# Patient Record
Sex: MALE | Race: WHITE | NOT HISPANIC OR LATINO | Employment: OTHER | URBAN - METROPOLITAN AREA
[De-identification: names, ages, dates, MRNs, and addresses within clinical notes are randomized per-mention and may not be internally consistent; named-entity substitution may affect disease eponyms.]

---

## 2017-01-10 DIAGNOSIS — K51.918 ULCERATIVE COLITIS, CHRONIC, OTHER COMPLICATION: ICD-10-CM

## 2017-01-10 RX ORDER — MORPHINE SULFATE 20 MG/ML
SOLUTION ORAL
Qty: 240 ML | Refills: 0 | Status: SHIPPED | OUTPATIENT
Start: 2017-01-10 | End: 2017-01-25 | Stop reason: SDUPTHER

## 2017-01-25 ENCOUNTER — OFFICE VISIT (OUTPATIENT)
Dept: SURGERY | Facility: CLINIC | Age: 72
End: 2017-01-25
Payer: MEDICARE

## 2017-01-25 VITALS
HEART RATE: 89 BPM | DIASTOLIC BLOOD PRESSURE: 70 MMHG | WEIGHT: 139.56 LBS | HEIGHT: 70 IN | BODY MASS INDEX: 19.98 KG/M2 | SYSTOLIC BLOOD PRESSURE: 131 MMHG

## 2017-01-25 DIAGNOSIS — Z43.4 ATTENTION TO ARTIFICIAL OPENING OF DIGESTIVE TRACT: Primary | ICD-10-CM

## 2017-01-25 DIAGNOSIS — K51.918 ULCERATIVE COLITIS, CHRONIC, OTHER COMPLICATION: ICD-10-CM

## 2017-01-25 PROCEDURE — 99213 OFFICE O/P EST LOW 20 MIN: CPT | Mod: PBBFAC,25 | Performed by: COLON & RECTAL SURGERY

## 2017-01-25 PROCEDURE — 99999 PR PBB SHADOW E&M-EST. PATIENT-LVL III: CPT | Mod: PBBFAC,,, | Performed by: COLON & RECTAL SURGERY

## 2017-01-25 PROCEDURE — 44385 ENDOSCOPY OF BOWEL POUCH: CPT | Mod: PBBFAC | Performed by: COLON & RECTAL SURGERY

## 2017-01-25 PROCEDURE — 99213 OFFICE O/P EST LOW 20 MIN: CPT | Mod: S$PBB,,, | Performed by: COLON & RECTAL SURGERY

## 2017-01-25 PROCEDURE — 44385 ENDOSCOPY OF BOWEL POUCH: CPT | Mod: S$PBB,,, | Performed by: COLON & RECTAL SURGERY

## 2017-01-25 RX ORDER — MORPHINE SULFATE 20 MG/ML
SOLUTION ORAL
Qty: 240 ML | Refills: 0 | Status: SHIPPED | OUTPATIENT
Start: 2017-02-06 | End: 2017-06-01 | Stop reason: SDUPTHER

## 2017-01-25 NOTE — PROGRESS NOTES
.   71 year old male patient presents today for a follow up      Has been breaking teeth. Bowels stable.  s/p Restorative Proctocolectomy with J-pouch ileoanal anastomosis for Ulcerative colitis December 1998. Has been followed for multiple BM. Normal FFS previous visits.    The patient has improved bowel function some days has normal BM. Taking Roxinal 10-15 drips Tid and 25 HS    Review of Systems      Constitutional: No fever, chills, activity or appetite change. 10 lb wt loss is undergoing work up     HENT: No hearing loss, facial swelling, neck pain or stiffness.       Eyes: No discharge, itching and visual disturbance.      Respiratory: No apnea, cough, choking or shortness of breath.       Cardiovascular: No leg swelling or chest pain      Gastrointestinal: No abdominal distention as above   Genitourinary: No dysuria, frequency or flank pain.      Musculoskeletal: stable gait difficulties     Neurological: No dizziness, seizures or weakness.      Hematological: No adenopathy.      Psychiatric/Behavioral: No hallucinations or behavioral problems.       MEDICAL HISTORY:  PAST MEDICAL HISTORY: Reviewed.    MEDICATIONS: SEE MEDCARD    ALLERGIES: See ALLERGY CARD    PE:   APPEARANCE: Well nourished, well developed, in no acute distress.   CHEST: Lungs clear. Normal respiratory effort.  CARDIOVASCULAR: Normal S1, S2. No rubs, murmurs or gallops. No edema.  ABDOMEN: Soft. No tenderness or masses. No hepatosplenomegaly.Healed incisions.  RECTAL: Increased sphincter tone. no masses.    PROCEDURE: Flexible Pouchoscopy   Scope # Olympus 940    With informed consent the flexible sigmoidoscope was passed 15 cm under direcet vision. Prep quality: fair    Findings: normal pouch mucosa    EBL: None    The procedure was tolerated well.        IMP: Restorative Proctocolectomy with J-pouch ileoanal anastomosis  PLAN:  Refil meds  RTC 6 months

## 2017-06-01 DIAGNOSIS — K51.918 ULCERATIVE COLITIS, CHRONIC, OTHER COMPLICATION: ICD-10-CM

## 2017-06-01 NOTE — TELEPHONE ENCOUNTER
----- Message from Millicent Ivan sent at 5/30/2017  2:31 PM CDT -----  Contact: pt [phone]  Prescription Refill Request  Name of medication and dose   morphine 100 mg/5 mL (20 mg/mL) concentrated solution    Pharmacy   [id number] [place] [facility]   [address] [place] [address]   [phone] (Phone)  [phone] (Fax)      Are you completely out of your medication Yes  Additional Information:

## 2017-06-02 RX ORDER — MORPHINE SULFATE 20 MG/ML
SOLUTION ORAL
Qty: 240 ML | Refills: 0 | Status: SHIPPED | OUTPATIENT
Start: 2017-06-02 | End: 2017-07-05 | Stop reason: SDUPTHER

## 2017-07-05 ENCOUNTER — OFFICE VISIT (OUTPATIENT)
Dept: SURGERY | Facility: CLINIC | Age: 72
End: 2017-07-05
Payer: MEDICARE

## 2017-07-05 VITALS
WEIGHT: 138.88 LBS | SYSTOLIC BLOOD PRESSURE: 124 MMHG | HEART RATE: 89 BPM | BODY MASS INDEX: 19.88 KG/M2 | HEIGHT: 70 IN | DIASTOLIC BLOOD PRESSURE: 79 MMHG

## 2017-07-05 DIAGNOSIS — Z43.4 ATTENTION TO ARTIFICIAL OPENING OF DIGESTIVE TRACT: Primary | ICD-10-CM

## 2017-07-05 DIAGNOSIS — K51.918 ULCERATIVE COLITIS, CHRONIC, OTHER COMPLICATION: ICD-10-CM

## 2017-07-05 PROCEDURE — 1126F AMNT PAIN NOTED NONE PRSNT: CPT | Mod: ,,, | Performed by: COLON & RECTAL SURGERY

## 2017-07-05 PROCEDURE — 99999 PR PBB SHADOW E&M-EST. PATIENT-LVL III: CPT | Mod: PBBFAC,,, | Performed by: COLON & RECTAL SURGERY

## 2017-07-05 PROCEDURE — 1159F MED LIST DOCD IN RCRD: CPT | Mod: ,,, | Performed by: COLON & RECTAL SURGERY

## 2017-07-05 PROCEDURE — 99214 OFFICE O/P EST MOD 30 MIN: CPT | Mod: S$PBB,,, | Performed by: COLON & RECTAL SURGERY

## 2017-07-05 PROCEDURE — 99213 OFFICE O/P EST LOW 20 MIN: CPT | Mod: PBBFAC | Performed by: COLON & RECTAL SURGERY

## 2017-07-05 RX ORDER — MORPHINE SULFATE 20 MG/ML
SOLUTION ORAL
Qty: 240 ML | Refills: 0 | Status: SHIPPED | OUTPATIENT
Start: 2017-07-17 | End: 2017-10-16 | Stop reason: SDUPTHER

## 2017-07-05 NOTE — PROGRESS NOTES
.   71 year old male patient presents today for a follow up        Bowels stable on MS Elixir. Pouchoscopy last visit was normal  s/p Restorative Proctocolectomy with J-pouch ileoanal anastomosis for Ulcerative colitis December 1998. Has been followed for multiple BM. Normal FFS previous visits.    The patient has improved bowel function some days has normal BM. Taking Roxinal 10-15 drips Tid and 25 HS    Review of Systems      Constitutional: No fever, chills, activity or appetite change. 10 lb wt loss is undergoing work up     HENT: No hearing loss, facial swelling, neck pain or stiffness.       Eyes: No discharge, itching and visual disturbance.      Respiratory: No apnea, cough, choking or shortness of breath.       Cardiovascular: No leg swelling or chest pain      Gastrointestinal: No abdominal distention as above   Genitourinary: No dysuria, frequency or flank pain.      Musculoskeletal: stable gait difficulties     Neurological: No dizziness, seizures or weakness.      Hematological: No adenopathy.      Psychiatric/Behavioral: No hallucinations or behavioral problems.       MEDICAL HISTORY:  PAST MEDICAL HISTORY: Reviewed.    MEDICATIONS: SEE MEDCARD    ALLERGIES: See ALLERGY CARD    PE:   APPEARANCE: Well nourished, well developed, in no acute distress.   CHEST: Lungs clear. Normal respiratory effort.  CARDIOVASCULAR: Normal S1, S2. No rubs, murmurs or gallops. No edema.  ABDOMEN: Soft. No tenderness or masses. No hepatosplenomegaly.Healed incisions.  RECTAL: Increased sphincter tone. no masses.         IMP: Restorative Proctocolectomy with J-pouch ileoanal anastomosis  PLAN:  Refil meds. RTC 4 months

## 2017-10-16 DIAGNOSIS — K51.918 ULCERATIVE COLITIS, CHRONIC, OTHER COMPLICATION: ICD-10-CM

## 2017-10-17 RX ORDER — MORPHINE SULFATE 20 MG/ML
SOLUTION ORAL
Qty: 240 ML | Refills: 0 | Status: SHIPPED | OUTPATIENT
Start: 2017-10-17 | End: 2017-11-08 | Stop reason: SDUPTHER

## 2017-11-08 ENCOUNTER — OFFICE VISIT (OUTPATIENT)
Dept: SURGERY | Facility: CLINIC | Age: 72
End: 2017-11-08
Payer: MEDICARE

## 2017-11-08 VITALS
WEIGHT: 143.5 LBS | HEIGHT: 70 IN | BODY MASS INDEX: 20.54 KG/M2 | DIASTOLIC BLOOD PRESSURE: 73 MMHG | HEART RATE: 78 BPM | SYSTOLIC BLOOD PRESSURE: 110 MMHG

## 2017-11-08 DIAGNOSIS — K51.918 ULCERATIVE COLITIS, CHRONIC, OTHER COMPLICATION: ICD-10-CM

## 2017-11-08 DIAGNOSIS — Z43.4 ATTENTION TO ARTIFICIAL OPENING OF DIGESTIVE TRACT: Primary | ICD-10-CM

## 2017-11-08 PROCEDURE — 99999 PR PBB SHADOW E&M-EST. PATIENT-LVL III: CPT | Mod: PBBFAC,,, | Performed by: COLON & RECTAL SURGERY

## 2017-11-08 PROCEDURE — 99213 OFFICE O/P EST LOW 20 MIN: CPT | Mod: PBBFAC | Performed by: COLON & RECTAL SURGERY

## 2017-11-08 PROCEDURE — 99213 OFFICE O/P EST LOW 20 MIN: CPT | Mod: S$PBB,,, | Performed by: COLON & RECTAL SURGERY

## 2017-11-08 RX ORDER — MORPHINE SULFATE 20 MG/ML
SOLUTION ORAL
Qty: 240 ML | Refills: 0 | Status: SHIPPED | OUTPATIENT
Start: 2017-11-08 | End: 2017-12-26 | Stop reason: SDUPTHER

## 2017-11-08 NOTE — PROGRESS NOTES
.   72 year old male patient presents today for a follow up        Bowels stable on MS Elixir. Pouchoscopy previous visit was normal  s/p Restorative Proctocolectomy with J-pouch ileoanal anastomosis for Ulcerative colitis December 1998. Has been followed for multiple BM. Normal FFS previous visits.    The patient has improved bowel function some days has normal BM. Taking Roxinal 10-15 drips Tid and 25 HS    Review of Systems      Constitutional: No fever, chills, activity or appetite change. 10 lb wt loss is undergoing work up     HENT: No hearing loss, facial swelling, neck pain or stiffness.       Eyes: No discharge, itching and visual disturbance.      Respiratory: No apnea, cough, choking or shortness of breath.       Cardiovascular: No leg swelling or chest pain      Gastrointestinal: No abdominal distention as above   Genitourinary: No dysuria, frequency or flank pain.      Musculoskeletal: stable gait difficulties     Neurological: No dizziness, seizures or weakness.      Hematological: No adenopathy.      Psychiatric/Behavioral: No hallucinations or behavioral problems.       MEDICAL HISTORY:  PAST MEDICAL HISTORY: Reviewed.    MEDICATIONS: SEE MEDCARD    ALLERGIES: See ALLERGY CARD    PE:   APPEARANCE: Well nourished, well developed, in no acute distress.   CHEST: Lungs clear. Normal respiratory effort.  CARDIOVASCULAR: Normal S1, S2. No rubs, murmurs or gallops. No edema.  ABDOMEN: Soft. No tenderness or masses. No hepatosplenomegaly.Healed incisions.  RECTAL: Increased sphincter tone. no masses.         IMP: Restorative Proctocolectomy with J-pouch ileoanal anastomosis  PLAN:  Refil meds. RTC 6 months

## 2017-12-26 DIAGNOSIS — K51.918 ULCERATIVE COLITIS, CHRONIC, OTHER COMPLICATION: ICD-10-CM

## 2017-12-26 RX ORDER — MORPHINE SULFATE 20 MG/ML
SOLUTION ORAL
Qty: 240 ML | Refills: 0 | Status: SHIPPED | OUTPATIENT
Start: 2017-12-26 | End: 2018-02-26 | Stop reason: SDUPTHER

## 2018-02-26 DIAGNOSIS — K51.918 ULCERATIVE COLITIS, CHRONIC, OTHER COMPLICATION: ICD-10-CM

## 2018-02-26 RX ORDER — MORPHINE SULFATE 20 MG/ML
SOLUTION ORAL
Qty: 240 ML | Refills: 0 | Status: SHIPPED | OUTPATIENT
Start: 2018-02-26 | End: 2018-05-03 | Stop reason: SDUPTHER

## 2018-05-03 DIAGNOSIS — K51.918 ULCERATIVE COLITIS, CHRONIC, OTHER COMPLICATION: ICD-10-CM

## 2018-05-03 RX ORDER — MORPHINE SULFATE 20 MG/ML
SOLUTION ORAL
Qty: 240 ML | Refills: 0 | Status: SHIPPED | OUTPATIENT
Start: 2018-05-03 | End: 2018-05-15 | Stop reason: SDUPTHER

## 2018-05-15 DIAGNOSIS — K51.918 ULCERATIVE COLITIS, CHRONIC, OTHER COMPLICATION: ICD-10-CM

## 2018-05-15 RX ORDER — MORPHINE SULFATE 20 MG/ML
SOLUTION ORAL
Qty: 240 ML | Refills: 0 | Status: SHIPPED | OUTPATIENT
Start: 2018-05-15 | End: 2018-07-02 | Stop reason: SDUPTHER

## 2018-06-11 DIAGNOSIS — K51.90: Primary | ICD-10-CM

## 2018-06-15 ENCOUNTER — HOSPITAL ENCOUNTER (OUTPATIENT)
Dept: RADIOLOGY | Facility: HOSPITAL | Age: 73
Discharge: HOME | End: 2018-06-15
Attending: FAMILY MEDICINE
Payer: MEDICARE

## 2018-06-15 DIAGNOSIS — K51.90: ICD-10-CM

## 2018-06-15 PROCEDURE — 74177 CT ABD & PELVIS W/CONTRAST: CPT | Mod: TC,PO

## 2018-06-15 PROCEDURE — 25500020 PHARM REV CODE 255: Mod: PO | Performed by: FAMILY MEDICINE

## 2018-06-15 RX ADMIN — IOHEXOL 75 ML: 350 INJECTION, SOLUTION INTRAVENOUS at 12:06

## 2018-07-02 DIAGNOSIS — K51.918 ULCERATIVE COLITIS, CHRONIC, OTHER COMPLICATION: ICD-10-CM

## 2018-07-02 RX ORDER — MORPHINE SULFATE 20 MG/ML
SOLUTION ORAL
Qty: 240 ML | Refills: 0 | Status: SHIPPED | OUTPATIENT
Start: 2018-07-02 | End: 2018-09-04 | Stop reason: SDUPTHER

## 2018-07-02 NOTE — TELEPHONE ENCOUNTER
----- Message from Lidya Rodriguez sent at 7/2/2018 10:22 AM CDT -----  Contact: pt#[phone]  Rx Refill/Request     Is this a Refill or New Rx:  refill  Rx Name and Strength:  morphine 100 mg/5 mL (20 mg/mL) concentrated solution  Preferred Pharmacy with phone number: [id number] - [address]  Communication Preference:my [facility] or callback   Additional Information:

## 2018-07-05 DIAGNOSIS — R79.89 ELEVATED TSH: Primary | ICD-10-CM

## 2018-07-12 ENCOUNTER — HOSPITAL ENCOUNTER (OUTPATIENT)
Dept: RADIOLOGY | Facility: HOSPITAL | Age: 73
Discharge: HOME | End: 2018-07-12
Attending: FAMILY MEDICINE
Payer: MEDICARE

## 2018-07-12 DIAGNOSIS — R79.89 ELEVATED TSH: ICD-10-CM

## 2018-07-12 PROCEDURE — 70450 CT HEAD/BRAIN W/O DYE: CPT | Mod: TC,PO

## 2018-09-04 DIAGNOSIS — K51.918 ULCERATIVE COLITIS, CHRONIC, OTHER COMPLICATION: ICD-10-CM

## 2018-09-04 NOTE — TELEPHONE ENCOUNTER
----- Message from Sheryl Alvares MA sent at 9/4/2018 10:53 AM CDT -----  Contact: [phone]                                Prescription Refill Request  Name of medication and dose morphine 100 mg/5 mL (20 mg/mL) concentrated solution    Pharmacy  [id number] - [place] - [address] [facility] [phone] (Phone)  [phone] (Fax)        Are you completely out of your medication No/pt has 3 days left    Additional Information: pt's # [phone]

## 2018-09-05 RX ORDER — MORPHINE SULFATE 20 MG/ML
SOLUTION ORAL
Qty: 240 ML | Refills: 0 | Status: SHIPPED | OUTPATIENT
Start: 2018-09-05 | End: 2018-11-13 | Stop reason: SDUPTHER

## 2018-11-12 ENCOUNTER — TELEPHONE (OUTPATIENT)
Dept: SURGERY | Facility: CLINIC | Age: 73
End: 2018-11-12

## 2018-11-12 NOTE — TELEPHONE ENCOUNTER
----- Message from Tammie Riley sent at 11/12/2018 12:53 PM CST -----  Contact: Pt:[phone]  .Rx Refill/Request     Is this a Refill or New Rx:Refill    Rx Name and Strength:roxanol    Preferred Pharmacy with phone number: [id number] [phone] (Phone)  [phone] (Fax)      Communication Preference:Pt:[phone]  Additional Information:

## 2018-11-13 DIAGNOSIS — K51.918 ULCERATIVE COLITIS, CHRONIC, OTHER COMPLICATION: ICD-10-CM

## 2018-11-13 RX ORDER — MORPHINE SULFATE 20 MG/ML
SOLUTION ORAL
Qty: 240 ML | Refills: 0 | Status: SHIPPED | OUTPATIENT
Start: 2018-11-13 | End: 2019-01-29 | Stop reason: SDUPTHER

## 2019-01-21 ENCOUNTER — TELEPHONE (OUTPATIENT)
Dept: SURGERY | Facility: CLINIC | Age: 74
End: 2019-01-21

## 2019-01-21 NOTE — TELEPHONE ENCOUNTER
----- Message from Philipp Howe MD sent at 1/21/2019  2:29 PM CST -----  Contact: Pt:[phone]  He should have an appt to be seen since we are trying to limit the patients on long term narcotics. Can you help to arrange this?      Thank you.    Hammad      ----- Message -----  From: Jillian Brewster RN  Sent: 1/21/2019  12:51 PM  To: Philipp Howe MD        ----- Message -----  From: Tammie Riley  Sent: 1/21/2019  10:06 AM  To: Carley Segal Staff    .Rx Refill/Request     Is this a Refill or New Rx:Refill    Rx Name and Strength:morphine 100 mg/5 mL (20 mg/mL) concentrated solution    Preferred Pharmacy with phone number: [id number] - [place] - [address] [place] [address] [phone] (Phone)  [phone] (Fax)      Communication Preference:Pt:[phone]  Additional Information: Pt called and states he would like to get a refill on his medication pt is a former pt of .

## 2019-01-21 NOTE — TELEPHONE ENCOUNTER
Called patient to schedule clinic visit. No answer. Left message to please call back and  he must be seen in clinic prior to RX refill.

## 2019-01-29 ENCOUNTER — TELEPHONE (OUTPATIENT)
Dept: SURGERY | Facility: CLINIC | Age: 74
End: 2019-01-29

## 2019-01-29 ENCOUNTER — OFFICE VISIT (OUTPATIENT)
Dept: SURGERY | Facility: CLINIC | Age: 74
End: 2019-01-29
Payer: MEDICARE

## 2019-01-29 VITALS
HEIGHT: 68 IN | DIASTOLIC BLOOD PRESSURE: 79 MMHG | SYSTOLIC BLOOD PRESSURE: 125 MMHG | BODY MASS INDEX: 23.12 KG/M2 | HEART RATE: 82 BPM | WEIGHT: 152.56 LBS

## 2019-01-29 DIAGNOSIS — K51.918 ULCERATIVE COLITIS, CHRONIC, OTHER COMPLICATION: ICD-10-CM

## 2019-01-29 DIAGNOSIS — K52.9 CHRONIC DIARRHEA: ICD-10-CM

## 2019-01-29 PROCEDURE — 99999 PR PBB SHADOW E&M-EST. PATIENT-LVL III: CPT | Mod: PBBFAC,,, | Performed by: COLON & RECTAL SURGERY

## 2019-01-29 PROCEDURE — 99213 PR OFFICE/OUTPT VISIT, EST, LEVL III, 20-29 MIN: ICD-10-PCS | Mod: S$PBB,,, | Performed by: COLON & RECTAL SURGERY

## 2019-01-29 PROCEDURE — 99999 PR PBB SHADOW E&M-EST. PATIENT-LVL III: ICD-10-PCS | Mod: PBBFAC,,, | Performed by: COLON & RECTAL SURGERY

## 2019-01-29 PROCEDURE — 99213 OFFICE O/P EST LOW 20 MIN: CPT | Mod: S$PBB,,, | Performed by: COLON & RECTAL SURGERY

## 2019-01-29 PROCEDURE — 99213 OFFICE O/P EST LOW 20 MIN: CPT | Mod: PBBFAC | Performed by: COLON & RECTAL SURGERY

## 2019-01-29 RX ORDER — MORPHINE SULFATE 20 MG/ML
SOLUTION ORAL
Qty: 240 ML | Refills: 0 | Status: SHIPPED | OUTPATIENT
Start: 2019-01-29 | End: 2019-03-12 | Stop reason: SDUPTHER

## 2019-01-29 NOTE — TELEPHONE ENCOUNTER
Left message that his appt with Dr Arshad needs to be cancelled. Dr Arshad is not seeing pt's for pain med refills. He will have to see a pain management doctor. If he is having a colon and rectal problem asked that he call so that I can make sure he get the appropriate appt.

## 2019-01-29 NOTE — LETTER
January 29, 2019      Rishabh Alcala MD  [address]  [facility] [address]           [facility]  [address]  [facility] [address]  Phone: [phone]          Patient: Sancho Pete   MR Number: [id number]   YOB: 1945   Date of Visit: 1/29/2019       Dear Dr. Rishabh Alcala:    Thank you for referring Sancho Pete to me for evaluation. Attached you will find relevant portions of my assessment and plan of care.    If you have questions, please do not hesitate to call me. I look forward to following Sancho Pete along with you.    Sincerely,    ROSSY Arshad MD    Enclosure  CC:  No Recipients    If you would like to receive this communication electronically, please contact [email or web address] or [phone] to request more information on [facility] access.    For providers and/or their staff who would like to refer a patient to [facility], please contact us through our one-stop-shop provider referral line, [facility], at [phone].    If you feel you have received this communication in error or would no longer like to receive these types of communications, please e-mail [email or web address]

## 2019-01-29 NOTE — PROGRESS NOTES
HPI:  Sancho Pete is a 73 y.o. male with history of chronic ulcerative colitis status post restorative proctocolectomy in 1998 at [facility].  Since closure of his ileostomy is had significant problems with pouch dysfunction and multiple bowel movements.  He has upwards of 18-24 bowel movements per day when not using any slowing agents.  He was initially started on paregoric but ultimately was switched over to liquid morphine sulfate which he has used 2 years, 10-15 drops every 8 hr and 25 drops at night.  He will have 2 bowel movements at night and 3-4 bowel movements during the day.  He reports that he is continent.  He ran out of liquid morphine over the past 2 weeks and he has been miserable because of increased stool frequency and urgency.      No past medical history on file.     No past surgical history on file.    Review of patient's allergies indicates:   Allergen Reactions    Codeine     Macrodantin [nitrofurantoin macrocrystalline]     Penicillins        No family history on file.    Social History     Socioeconomic History    Marital status: Single     Spouse name: None    Number of children: None    Years of education: None    Highest education level: None   Social Needs    Financial resource strain: None    Food insecurity - worry: None    Food insecurity - inability: None    Transportation needs - medical: None    Transportation needs - non-medical: None   Occupational History    None   Tobacco Use    Smoking status: Never Smoker   Substance and Sexual Activity    Alcohol use: No     Alcohol/week: 0.0 oz    Drug use: No    Sexual activity: None   Other Topics Concern    None   Social History Narrative    None       ROS:  GENERAL: No fever, chills, fatigability or weight loss.  Integument: No rashes, redness, icterus  CHEST: Denies BOWSER, cyanosis, wheezing, cough and sputum production.  CARDIOVASCULAR: Denies chest pain, PND, orthopnea or reduced exercise  "tolerance.  GI: Denies abd pain, dysphagia, nausea, vomiting, no hematemesis   : Denies burning on urination, no hematuria, no bacteriuria  MSK: No deformities, swelling, joint pain swelling  Neurologic: No HAs, seizures, weakness, paresthesias, gait problems    PE:  General appearance nontoxic  /79 (BP Location: Right arm, Patient Position: Sitting, BP Method: Large (Automatic))   Pulse 82   Ht 5' 7.5" (1.715 m)   Wt 69.2 kg (152 lb 8.9 oz)   BMI 23.54 kg/m²   Sclera/ Skin anicteric  AT NC EOMI  Neck supple trachea midline   Chest symmetric, nl excursion, no retractions, breathing comfortably  EXT - no CCE  Neuro:  Mood/ affect nl, alert and oriented x 3, moves all ext's, gait nl    Assessment:  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    Plan:  I have renewed his prescription.  I will continue to see him every 6 months.   He will need yearly ileal pouch endoscopy    "

## 2019-03-12 ENCOUNTER — OFFICE VISIT (OUTPATIENT)
Dept: SURGERY | Facility: CLINIC | Age: 74
End: 2019-03-12
Payer: MEDICARE

## 2019-03-12 VITALS
DIASTOLIC BLOOD PRESSURE: 72 MMHG | HEART RATE: 100 BPM | SYSTOLIC BLOOD PRESSURE: 137 MMHG | BODY MASS INDEX: 23.98 KG/M2 | HEIGHT: 67 IN | WEIGHT: 152.75 LBS

## 2019-03-12 DIAGNOSIS — K51.918 ULCERATIVE COLITIS, CHRONIC, OTHER COMPLICATION: Primary | ICD-10-CM

## 2019-03-12 PROCEDURE — 99999 PR PBB SHADOW E&M-EST. PATIENT-LVL III: CPT | Mod: PBBFAC,,, | Performed by: COLON & RECTAL SURGERY

## 2019-03-12 PROCEDURE — 99999 PR PBB SHADOW E&M-EST. PATIENT-LVL III: ICD-10-PCS | Mod: PBBFAC,,, | Performed by: COLON & RECTAL SURGERY

## 2019-03-12 PROCEDURE — 99213 OFFICE O/P EST LOW 20 MIN: CPT | Mod: PBBFAC | Performed by: COLON & RECTAL SURGERY

## 2019-03-12 PROCEDURE — 99213 PR OFFICE/OUTPT VISIT, EST, LEVL III, 20-29 MIN: ICD-10-PCS | Mod: S$PBB,,, | Performed by: COLON & RECTAL SURGERY

## 2019-03-12 PROCEDURE — 99213 OFFICE O/P EST LOW 20 MIN: CPT | Mod: S$PBB,,, | Performed by: COLON & RECTAL SURGERY

## 2019-03-12 RX ORDER — MORPHINE SULFATE 20 MG/ML
SOLUTION ORAL
Qty: 240 ML | Refills: 0 | Status: SHIPPED | OUTPATIENT
Start: 2019-03-12 | End: 2019-05-31

## 2019-03-12 NOTE — PROGRESS NOTES
"HPI:  Sancho Pete is a 73 y.o. male who needs a refill on his prescription for liquid morphine for control of diarrhea.  He denies any new problem      No past medical history on file.     No past surgical history on file.    Review of patient's allergies indicates:   Allergen Reactions    Codeine     Macrodantin [nitrofurantoin macrocrystalline]     Penicillins        No family history on file.    Social History     Socioeconomic History    Marital status: Single     Spouse name: Not on file    Number of children: Not on file    Years of education: Not on file    Highest education level: Not on file   Social Needs    Financial resource strain: Not on file    Food insecurity - worry: Not on file    Food insecurity - inability: Not on file    Transportation needs - medical: Not on file    Transportation needs - non-medical: Not on file   Occupational History    Not on file   Tobacco Use    Smoking status: Never Smoker   Substance and Sexual Activity    Alcohol use: No     Alcohol/week: 0.0 oz    Drug use: No    Sexual activity: Not on file   Other Topics Concern    Not on file   Social History Narrative    Not on file       ROS:  GENERAL: No fever, chills, fatigability or weight loss.  Integument: No rashes, redness, icterus  CHEST: Denies BOWSER, cyanosis, wheezing, cough and sputum production.  CARDIOVASCULAR: Denies chest pain, PND, orthopnea or reduced exercise tolerance.  GI: Denies abd pain, dysphagia, nausea, vomiting, no hematemesis   : Denies burning on urination, no hematuria, no bacteriuria  MSK: No deformities, swelling, joint pain swelling  Neurologic: No HAs, seizures, weakness, paresthesias, gait problems    PE:  General appearance  Well  /72 (BP Location: Right arm, Patient Position: Sitting, BP Method: Large (Automatic))   Pulse 100   Ht 5' 7" (1.702 m)   Wt 69.3 kg (152 lb 12.5 oz)   BMI 23.93 kg/m²     Sclera/ Skin anicteric  AT NC EOMI  Neck supple trachea " midline   Chest symmetric, nl excursion, no retractions, breathing comfortably  EXT - no CCE  Neuro:  Mood/ affect nl, alert and oriented x 3, moves all ext's, gait nl        Assessment:  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

## 2019-05-30 ENCOUNTER — TELEPHONE (OUTPATIENT)
Dept: SURGERY | Facility: CLINIC | Age: 74
End: 2019-05-30

## 2019-05-30 NOTE — TELEPHONE ENCOUNTER
----- Message from Lidya Rodriguez sent at 5/30/2019  9:18 AM CDT -----  Contact: pt#[phone]  Rx Refill/Request     Is this a Refill or New Rx:  Refill  Rx Name and Strength:  roxanol   Preferred Pharmacy with phone number:   [id number] - [address] [address]  Phone: [phone] Fax: [phone]  Communication Preference:call  Additional Information:

## 2019-05-31 ENCOUNTER — TELEPHONE (OUTPATIENT)
Dept: SURGERY | Facility: CLINIC | Age: 74
End: 2019-05-31

## 2019-05-31 DIAGNOSIS — K51.918 ULCERATIVE COLITIS, CHRONIC, OTHER COMPLICATION: ICD-10-CM

## 2019-05-31 RX ORDER — MORPHINE SULFATE 20 MG/ML
SOLUTION ORAL
Qty: 240 ML | Refills: 0 | Status: SHIPPED | OUTPATIENT
Start: 2019-05-31 | End: 2019-05-31

## 2019-05-31 RX ORDER — MORPHINE SULFATE 20 MG/ML
SOLUTION ORAL
Qty: 240 ML | Refills: 0 | Status: SHIPPED | OUTPATIENT
Start: 2019-05-31 | End: 2019-08-06

## 2019-05-31 NOTE — TELEPHONE ENCOUNTER
----- Message from Lidya Rodriguez sent at 5/31/2019 11:07 AM CDT -----  Contact: pt#pt#[phone]  Needs Advice    Reason for call:Pt is calling for status of refill. He states that he only have medication to cover him until Monday       Communication Preference:call    Additional Information:

## 2019-08-06 ENCOUNTER — TELEPHONE (OUTPATIENT)
Dept: SURGERY | Facility: CLINIC | Age: 74
End: 2019-08-06

## 2019-08-06 DIAGNOSIS — K51.918 ULCERATIVE COLITIS, CHRONIC, OTHER COMPLICATION: ICD-10-CM

## 2019-08-06 RX ORDER — MORPHINE SULFATE 20 MG/ML
SOLUTION ORAL
Qty: 240 ML | Refills: 0 | Status: SHIPPED | OUTPATIENT
Start: 2019-08-06 | End: 2019-08-19 | Stop reason: SDUPTHER

## 2019-08-06 NOTE — TELEPHONE ENCOUNTER
To pt Carrie gave me his rx. He can pick it up at the . He is unable to come in. I will put in the mail today.

## 2019-08-06 NOTE — TELEPHONE ENCOUNTER
----- Message from Lidya Rodirguez sent at 8/6/2019 11:48 AM CDT -----  Contact: pt#[phone]  Rx Refill/Request     Is this a Refill or New Rx:  refill  Rx Name and Strength: morphine 100 mg/5 mL (20 mg/mL) concentrated solution   Preferred Pharmacy with phone number: [facility] phone#[phone]    Communication Preference:call pt  Additional Information:

## 2019-08-15 ENCOUNTER — TELEPHONE (OUTPATIENT)
Dept: SURGERY | Facility: CLINIC | Age: 74
End: 2019-08-15

## 2019-08-15 NOTE — TELEPHONE ENCOUNTER
----- Message from Amna Johnson sent at 8/15/2019  1:11 PM CDT -----  Contact: self [phone]  Needs Advice    Reason for call: Pt call regarding his rx for his morphine after august 1st the law changed. The rx has to be label medically necessary for extended use and it has to be a certain amount. He states he is going to be running out soon.         Communication Preference: self self [phone]    Additional Information:

## 2019-08-15 NOTE — TELEPHONE ENCOUNTER
Pt needs a new RX. He was only given a 7 day supply due to a new law. I will talk to Dr Arshad tomorrow about it.

## 2019-08-19 DIAGNOSIS — K51.918 ULCERATIVE COLITIS, CHRONIC, OTHER COMPLICATION: ICD-10-CM

## 2019-08-19 RX ORDER — MORPHINE SULFATE 20 MG/ML
SOLUTION ORAL
Qty: 240 ML | Refills: 0 | Status: SHIPPED | OUTPATIENT
Start: 2019-08-19 | End: 2019-10-23

## 2019-08-20 ENCOUNTER — TELEPHONE (OUTPATIENT)
Dept: SURGERY | Facility: CLINIC | Age: 74
End: 2019-08-20

## 2019-08-20 NOTE — TELEPHONE ENCOUNTER
Spoke to the patient to let him know that his prescription was put in the mail on Friday but may not have left the building until Monday. He asked if it being medically necessary was put on the prescription. I told him that it was.     ----- Message from Lidya Rodriguez sent at 8/20/2019  2:56 PM CDT -----  Contact: pt [phone]  Pt states that he did not received Rx for morphine 100 mg/5 mL (20 mg/mL) concentrated solution

## 2019-10-23 ENCOUNTER — TELEPHONE (OUTPATIENT)
Dept: SURGERY | Facility: CLINIC | Age: 74
End: 2019-10-23

## 2019-10-23 DIAGNOSIS — K51.918 ULCERATIVE COLITIS, CHRONIC, OTHER COMPLICATION: ICD-10-CM

## 2019-10-23 RX ORDER — MORPHINE SULFATE 20 MG/ML
SOLUTION ORAL
Qty: 240 ML | Refills: 0 | Status: SHIPPED | OUTPATIENT
Start: 2019-10-23 | End: 2019-12-20 | Stop reason: SDUPTHER

## 2019-10-23 NOTE — TELEPHONE ENCOUNTER
Patient needs prescription.  Will sent to pharmacy.    ----- Message from Lidya Rodriguez sent at 10/23/2019  9:45 AM CDT -----  Contact: pt#[phone]  Refill:    MORPHINE SULFATE (ROXANOL CONCENTRATE ORAL)    Pt wants the Rx mail to home address.

## 2019-12-20 ENCOUNTER — TELEPHONE (OUTPATIENT)
Dept: SURGERY | Facility: CLINIC | Age: 74
End: 2019-12-20

## 2019-12-20 DIAGNOSIS — K51.918 ULCERATIVE COLITIS, CHRONIC, OTHER COMPLICATION: ICD-10-CM

## 2019-12-20 RX ORDER — MORPHINE SULFATE 20 MG/ML
SOLUTION ORAL
Qty: 240 ML | Refills: 0 | Status: SHIPPED | OUTPATIENT
Start: 2019-12-20 | End: 2019-12-20 | Stop reason: SDUPTHER

## 2019-12-20 RX ORDER — MORPHINE SULFATE 20 MG/ML
SOLUTION ORAL
Qty: 240 ML | Refills: 0 | Status: SHIPPED | OUTPATIENT
Start: 2019-12-20 | End: 2019-12-30 | Stop reason: SDUPTHER

## 2019-12-30 DIAGNOSIS — K51.918 ULCERATIVE COLITIS, CHRONIC, OTHER COMPLICATION: ICD-10-CM

## 2019-12-30 RX ORDER — MORPHINE SULFATE 20 MG/ML
SOLUTION ORAL
Qty: 240 ML | Refills: 0 | Status: SHIPPED | OUTPATIENT
Start: 2019-12-30 | End: 2020-03-02 | Stop reason: SDUPTHER

## 2020-03-02 ENCOUNTER — TELEPHONE (OUTPATIENT)
Dept: SURGERY | Facility: CLINIC | Age: 75
End: 2020-03-02

## 2020-03-02 DIAGNOSIS — K51.918 ULCERATIVE COLITIS, CHRONIC, OTHER COMPLICATION: ICD-10-CM

## 2020-03-02 RX ORDER — MORPHINE SULFATE 20 MG/ML
SOLUTION ORAL
Qty: 240 ML | Refills: 0 | Status: SHIPPED | OUTPATIENT
Start: 2020-03-02 | End: 2020-05-04 | Stop reason: SDUPTHER

## 2020-03-02 NOTE — TELEPHONE ENCOUNTER
----- Message from ROSSY Arshad MD sent at 3/2/2020  9:28 AM CST -----  Contact: pt [phone]  Please notify pt that prescription sent in this am    ----- Message -----  From: Karine Erwin LPN  Sent: 2/28/2020   4:00 PM CST  To: ROSSY Arshad MD     Left message that I sent Dr Arshad the message but will not know when it is sent to the pharmacy.  Karine  ----- Message -----  From: Lidya Rodriguez  Sent: 2/28/2020   9:14 AM CST  To: [facility]  (message) for  morphine 100 mg/5 mL (20 mg/mL) concentrated solution  [facility] [phone]    Please call pt to let him know when the Rx is sent

## 2020-05-04 DIAGNOSIS — K51.918 ULCERATIVE COLITIS, CHRONIC, OTHER COMPLICATION: ICD-10-CM

## 2020-05-04 RX ORDER — MORPHINE SULFATE 20 MG/ML
SOLUTION ORAL
Qty: 240 ML | Refills: 0 | Status: SHIPPED | OUTPATIENT
Start: 2020-05-04 | End: 2020-07-08 | Stop reason: SDUPTHER

## 2020-07-08 ENCOUNTER — TELEPHONE (OUTPATIENT)
Dept: SURGERY | Facility: CLINIC | Age: 75
End: 2020-07-08

## 2020-07-08 DIAGNOSIS — K51.918 ULCERATIVE COLITIS, CHRONIC, OTHER COMPLICATION: ICD-10-CM

## 2020-07-08 RX ORDER — MORPHINE SULFATE 20 MG/ML
SOLUTION ORAL
Qty: 240 ML | Refills: 0 | Status: SHIPPED | OUTPATIENT
Start: 2020-07-08 | End: 2020-09-10 | Stop reason: SDUPTHER

## 2020-09-10 ENCOUNTER — TELEPHONE (OUTPATIENT)
Dept: SURGERY | Facility: HOSPITAL | Age: 75
End: 2020-09-10

## 2020-09-10 DIAGNOSIS — K51.918 ULCERATIVE COLITIS, CHRONIC, OTHER COMPLICATION: ICD-10-CM

## 2020-09-10 RX ORDER — MORPHINE SULFATE 20 MG/ML
SOLUTION ORAL
Qty: 240 ML | Refills: 0 | Status: SHIPPED | OUTPATIENT
Start: 2020-09-10 | End: 2020-11-25 | Stop reason: SDUPTHER

## 2020-11-25 ENCOUNTER — TELEPHONE (OUTPATIENT)
Dept: SURGERY | Facility: CLINIC | Age: 75
End: 2020-11-25

## 2020-11-25 DIAGNOSIS — K51.918 ULCERATIVE COLITIS, CHRONIC, OTHER COMPLICATION: ICD-10-CM

## 2020-11-25 RX ORDER — MORPHINE SULFATE 20 MG/ML
SOLUTION ORAL
Qty: 240 ML | Refills: 0 | Status: SHIPPED | OUTPATIENT
Start: 2020-11-25 | End: 2021-01-27 | Stop reason: SDUPTHER

## 2021-01-27 ENCOUNTER — TELEPHONE (OUTPATIENT)
Dept: SURGERY | Facility: CLINIC | Age: 76
End: 2021-01-27

## 2021-01-27 DIAGNOSIS — K51.918 ULCERATIVE COLITIS, CHRONIC, OTHER COMPLICATION: ICD-10-CM

## 2021-01-27 RX ORDER — MORPHINE SULFATE 20 MG/ML
SOLUTION ORAL
Qty: 240 ML | Refills: 0 | Status: SHIPPED | OUTPATIENT
Start: 2021-01-27 | End: 2021-04-06 | Stop reason: SDUPTHER

## 2021-04-06 DIAGNOSIS — K51.918 ULCERATIVE COLITIS, CHRONIC, OTHER COMPLICATION: ICD-10-CM

## 2021-04-06 RX ORDER — MORPHINE SULFATE 20 MG/ML
SOLUTION ORAL
Qty: 240 ML | Refills: 0 | Status: SHIPPED | OUTPATIENT
Start: 2021-04-06 | End: 2021-06-03 | Stop reason: SDUPTHER

## 2021-06-03 DIAGNOSIS — K51.918 ULCERATIVE COLITIS, CHRONIC, OTHER COMPLICATION: ICD-10-CM

## 2021-06-03 RX ORDER — MORPHINE SULFATE 20 MG/ML
SOLUTION ORAL
Qty: 240 ML | Refills: 0 | Status: SHIPPED | OUTPATIENT
Start: 2021-06-03 | End: 2021-06-09 | Stop reason: SDUPTHER

## 2021-06-09 ENCOUNTER — TELEPHONE (OUTPATIENT)
Dept: SURGERY | Facility: CLINIC | Age: 76
End: 2021-06-09

## 2021-06-09 DIAGNOSIS — K51.918 ULCERATIVE COLITIS, CHRONIC, OTHER COMPLICATION: ICD-10-CM

## 2021-06-09 RX ORDER — MORPHINE SULFATE 20 MG/ML
SOLUTION ORAL
Qty: 240 ML | Refills: 0 | Status: SHIPPED | OUTPATIENT
Start: 2021-06-09 | End: 2021-08-10 | Stop reason: SDUPTHER

## 2021-08-10 ENCOUNTER — TELEPHONE (OUTPATIENT)
Dept: SURGERY | Facility: CLINIC | Age: 76
End: 2021-08-10

## 2021-08-10 DIAGNOSIS — K51.918 ULCERATIVE COLITIS, CHRONIC, OTHER COMPLICATION: ICD-10-CM

## 2021-08-10 RX ORDER — MORPHINE SULFATE 20 MG/ML
SOLUTION ORAL
Qty: 240 ML | Refills: 0 | Status: SHIPPED | OUTPATIENT
Start: 2021-08-10 | End: 2021-10-12 | Stop reason: SDUPTHER

## 2021-10-12 ENCOUNTER — TELEPHONE (OUTPATIENT)
Dept: SURGERY | Facility: CLINIC | Age: 76
End: 2021-10-12

## 2021-10-12 DIAGNOSIS — K51.918 ULCERATIVE COLITIS, CHRONIC, OTHER COMPLICATION: ICD-10-CM

## 2021-10-12 RX ORDER — MORPHINE SULFATE 20 MG/ML
SOLUTION ORAL
Qty: 240 ML | Refills: 0 | Status: SHIPPED | OUTPATIENT
Start: 2021-10-12 | End: 2021-12-16 | Stop reason: SDUPTHER

## 2021-12-14 ENCOUNTER — TELEPHONE (OUTPATIENT)
Dept: SURGERY | Facility: CLINIC | Age: 76
End: 2021-12-14
Payer: MEDICARE

## 2021-12-14 DIAGNOSIS — K51.918 ULCERATIVE COLITIS, CHRONIC, OTHER COMPLICATION: ICD-10-CM

## 2021-12-14 RX ORDER — MORPHINE SULFATE 20 MG/ML
SOLUTION ORAL
Qty: 240 ML | Refills: 0 | OUTPATIENT
Start: 2021-12-14

## 2021-12-15 ENCOUNTER — TELEPHONE (OUTPATIENT)
Dept: SURGERY | Facility: CLINIC | Age: 76
End: 2021-12-15
Payer: MEDICARE

## 2021-12-15 ENCOUNTER — OFFICE VISIT (OUTPATIENT)
Dept: SURGERY | Facility: CLINIC | Age: 76
End: 2021-12-15
Payer: MEDICARE

## 2021-12-15 VITALS
HEART RATE: 91 BPM | HEIGHT: 69 IN | BODY MASS INDEX: 20.35 KG/M2 | SYSTOLIC BLOOD PRESSURE: 116 MMHG | DIASTOLIC BLOOD PRESSURE: 56 MMHG | WEIGHT: 137.38 LBS

## 2021-12-15 DIAGNOSIS — K52.9 CHRONIC DIARRHEA: Primary | ICD-10-CM

## 2021-12-15 DIAGNOSIS — K51.918 ULCERATIVE COLITIS, CHRONIC, OTHER COMPLICATION: ICD-10-CM

## 2021-12-15 PROCEDURE — 99024 PR POST-OP FOLLOW-UP VISIT: ICD-10-PCS | Mod: POP,,, | Performed by: NURSE PRACTITIONER

## 2021-12-15 PROCEDURE — 99213 OFFICE O/P EST LOW 20 MIN: CPT | Mod: PBBFAC | Performed by: COLON & RECTAL SURGERY

## 2021-12-15 PROCEDURE — 99213 PR OFFICE/OUTPT VISIT, EST, LEVL III, 20-29 MIN: ICD-10-PCS | Mod: S$PBB,,, | Performed by: COLON & RECTAL SURGERY

## 2021-12-15 PROCEDURE — 99999 PR PBB SHADOW E&M-EST. PATIENT-LVL III: ICD-10-PCS | Mod: PBBFAC,,, | Performed by: COLON & RECTAL SURGERY

## 2021-12-15 PROCEDURE — 99024 POSTOP FOLLOW-UP VISIT: CPT | Mod: POP,,, | Performed by: NURSE PRACTITIONER

## 2021-12-15 PROCEDURE — 99213 OFFICE O/P EST LOW 20 MIN: CPT | Mod: S$PBB,,, | Performed by: COLON & RECTAL SURGERY

## 2021-12-15 PROCEDURE — 99999 PR PBB SHADOW E&M-EST. PATIENT-LVL III: CPT | Mod: PBBFAC,,, | Performed by: COLON & RECTAL SURGERY

## 2021-12-15 RX ORDER — DIGOXIN 125 MCG
125 TABLET ORAL DAILY
COMMUNITY

## 2021-12-16 RX ORDER — NALOXONE HYDROCHLORIDE 4 MG/.1ML
SPRAY NASAL
Qty: 1 EACH | Refills: 11 | Status: SHIPPED | OUTPATIENT
Start: 2021-12-16 | End: 2022-06-28 | Stop reason: SDUPTHER

## 2021-12-16 RX ORDER — MORPHINE SULFATE 20 MG/ML
SOLUTION ORAL
Qty: 240 ML | Refills: 0 | Status: SHIPPED | OUTPATIENT
Start: 2021-12-16 | End: 2022-02-23 | Stop reason: SDUPTHER

## 2021-12-17 ENCOUNTER — TELEPHONE (OUTPATIENT)
Dept: SURGERY | Facility: CLINIC | Age: 76
End: 2021-12-17
Payer: MEDICARE

## 2022-02-22 DIAGNOSIS — K51.918 ULCERATIVE COLITIS, CHRONIC, OTHER COMPLICATION: ICD-10-CM

## 2022-02-22 RX ORDER — MORPHINE SULFATE 20 MG/ML
SOLUTION ORAL
Qty: 240 ML | Refills: 0 | OUTPATIENT
Start: 2022-02-22

## 2022-02-23 DIAGNOSIS — K51.918 ULCERATIVE COLITIS, CHRONIC, OTHER COMPLICATION: ICD-10-CM

## 2022-02-23 RX ORDER — MORPHINE SULFATE 20 MG/ML
SOLUTION ORAL
Qty: 240 ML | Refills: 0 | Status: SHIPPED | OUTPATIENT
Start: 2022-02-23 | End: 2022-04-26 | Stop reason: SDUPTHER

## 2022-04-26 DIAGNOSIS — K51.918 ULCERATIVE COLITIS, CHRONIC, OTHER COMPLICATION: ICD-10-CM

## 2022-04-26 RX ORDER — MORPHINE SULFATE 20 MG/ML
SOLUTION ORAL
Qty: 240 ML | Refills: 0 | Status: SHIPPED | OUTPATIENT
Start: 2022-04-26 | End: 2022-06-28 | Stop reason: SDUPTHER

## 2022-06-15 ENCOUNTER — OFFICE VISIT (OUTPATIENT)
Dept: SURGERY | Facility: CLINIC | Age: 77
End: 2022-06-15
Payer: MEDICARE

## 2022-06-15 VITALS
HEIGHT: 69 IN | SYSTOLIC BLOOD PRESSURE: 117 MMHG | WEIGHT: 135.81 LBS | DIASTOLIC BLOOD PRESSURE: 76 MMHG | HEART RATE: 74 BPM | BODY MASS INDEX: 20.11 KG/M2

## 2022-06-15 DIAGNOSIS — K52.9 CHRONIC DIARRHEA: ICD-10-CM

## 2022-06-15 DIAGNOSIS — Z87.19 HISTORY OF ULCERATIVE COLITIS: Primary | ICD-10-CM

## 2022-06-15 DIAGNOSIS — K62.89 RECTAL OR ANAL PAIN: ICD-10-CM

## 2022-06-15 PROCEDURE — 99999 PR PBB SHADOW E&M-EST. PATIENT-LVL II: CPT | Mod: PBBFAC,,, | Performed by: COLON & RECTAL SURGERY

## 2022-06-15 PROCEDURE — 99212 OFFICE O/P EST SF 10 MIN: CPT | Mod: PBBFAC | Performed by: COLON & RECTAL SURGERY

## 2022-06-15 PROCEDURE — 99214 OFFICE O/P EST MOD 30 MIN: CPT | Mod: S$PBB,,, | Performed by: COLON & RECTAL SURGERY

## 2022-06-15 PROCEDURE — 99214 PR OFFICE/OUTPT VISIT, EST, LEVL IV, 30-39 MIN: ICD-10-PCS | Mod: S$PBB,,, | Performed by: COLON & RECTAL SURGERY

## 2022-06-15 PROCEDURE — 99999 PR PBB SHADOW E&M-EST. PATIENT-LVL II: ICD-10-PCS | Mod: PBBFAC,,, | Performed by: COLON & RECTAL SURGERY

## 2022-06-15 NOTE — PROGRESS NOTES
"HPI:  Sancho Pete is a 76 y.o. male with history of AMEYA and RPC and IPAA     2 small BMs - one in am and one 4pm  Multiple BMs - between 9 and 1 am    Larger does 20 drops at MN.  10-15 drops other q 6hours    22-3 months one bad night with multiple loose watery stools and burning  Applies cream and resolves with time.  Next day back to routine.   No past medical history on file.     No past surgical history on file.    Review of patient's allergies indicates:   Allergen Reactions    Codeine     Macrodantin [nitrofurantoin macrocrystalline]     Penicillins        No family history on file.    Social History     Socioeconomic History    Marital status: Single   Tobacco Use    Smoking status: Never Smoker    Smokeless tobacco: Former User   Substance and Sexual Activity    Alcohol use: No     Alcohol/week: 0.0 standard drinks    Drug use: No       ROS:  GENERAL: No fever, chills, fatigability or weight loss.  Integument: No rashes, redness, icterus  CHEST: Denies BOWSER, cyanosis, wheezing, cough and sputum production.  CARDIOVASCULAR: Denies chest pain, PND, orthopnea or reduced exercise tolerance.  GI: Denies abd pain, dysphagia, nausea, vomiting, no hematemesis   : Denies burning on urination, no hematuria, no bacteriuria  MSK: No deformities, swelling, joint pain swelling  Neurologic: No HAs, seizures, weakness, paresthesias, gait problems    PE:  General appearance well  /76 (BP Location: Left arm, Patient Position: Sitting, BP Method: Medium (Automatic))   Pulse 74   Ht 5' 9" (1.753 m)   Wt 61.6 kg (135 lb 12.9 oz)   BMI 20.05 kg/m²   Sclera/ Skin anicteric  LN none palpable  AT NC EOMI  Neck supple trachea midline   Chest symmetric, nl excursion, no retractions, breathing comfortably  Abdomen  ND soft NT.  no masses, no organomegaly  EXT - no CCE  Neuro:  Mood/ affect nl, alert and oriented x 3, moves all ext's, gait nl      Assessment:  Stable pouch function  Needs surveillance pouch " endoscopy    Plan:  Pouch endoscopy in the FALL  Change large dose 20 drops to 1800 hours or 6 pm.

## 2022-06-28 DIAGNOSIS — K51.918 ULCERATIVE COLITIS, CHRONIC, OTHER COMPLICATION: ICD-10-CM

## 2022-06-28 RX ORDER — MORPHINE SULFATE 20 MG/ML
SOLUTION ORAL
Qty: 240 ML | Refills: 0 | Status: SHIPPED | OUTPATIENT
Start: 2022-06-28 | End: 2022-09-06 | Stop reason: SDUPTHER

## 2022-06-28 RX ORDER — NALOXONE HYDROCHLORIDE 4 MG/.1ML
SPRAY NASAL
Qty: 1 EACH | Refills: 11 | Status: SHIPPED | OUTPATIENT
Start: 2022-06-28

## 2022-06-28 NOTE — TELEPHONE ENCOUNTER
----- Message from Desiree Wallis sent at 6/28/2022 10:37 AM CDT -----  Requesting refill medication of Morphine sol  Please send to pharmacy on file  Patient can be contacted @# [phone]

## 2022-09-06 DIAGNOSIS — K51.918 ULCERATIVE COLITIS, CHRONIC, OTHER COMPLICATION: ICD-10-CM

## 2022-09-06 RX ORDER — MORPHINE SULFATE 20 MG/ML
SOLUTION ORAL
Qty: 240 ML | Refills: 0 | Status: SHIPPED | OUTPATIENT
Start: 2022-09-06 | End: 2022-11-15 | Stop reason: SDUPTHER

## 2022-11-15 DIAGNOSIS — K51.918 ULCERATIVE COLITIS, CHRONIC, OTHER COMPLICATION: ICD-10-CM

## 2022-11-15 RX ORDER — MORPHINE SULFATE 20 MG/ML
SOLUTION ORAL
Qty: 240 ML | Refills: 0 | Status: SHIPPED | OUTPATIENT
Start: 2022-11-15 | End: 2023-01-17 | Stop reason: SDUPTHER

## 2023-01-17 DIAGNOSIS — K51.918 ULCERATIVE COLITIS, CHRONIC, OTHER COMPLICATION: ICD-10-CM

## 2023-01-17 RX ORDER — MORPHINE SULFATE 20 MG/ML
SOLUTION ORAL
Qty: 240 ML | Refills: 0 | Status: SHIPPED | OUTPATIENT
Start: 2023-01-17 | End: 2023-03-28 | Stop reason: SDUPTHER

## 2023-03-28 ENCOUNTER — TELEPHONE (OUTPATIENT)
Dept: SURGERY | Facility: CLINIC | Age: 78
End: 2023-03-28
Payer: MEDICARE

## 2023-03-28 DIAGNOSIS — K51.918 ULCERATIVE COLITIS, CHRONIC, OTHER COMPLICATION: ICD-10-CM

## 2023-03-28 RX ORDER — MORPHINE SULFATE 20 MG/ML
SOLUTION ORAL
Qty: 240 ML | Refills: 0 | Status: SHIPPED | OUTPATIENT
Start: 2023-03-28 | End: 2023-06-06 | Stop reason: SDUPTHER

## 2023-03-28 NOTE — TELEPHONE ENCOUNTER
----- Message from Yecenia Ba sent at 3/28/2023 12:25 PM CDT -----  Regarding: Refill  Contact: pt [phone]  Rx Refill/Request     Is this a Refill or New Rx:  Refill     Rx Name and Strength:  morphine 100 mg/5 mL (20 mg/mL) concentrated solution 240 mL 0     Preferred Pharmacy with phone number:      [id number] - [place], [place]  [address] [place] [address]  Phone: [phone] Fax: [phone]

## 2023-06-06 DIAGNOSIS — K51.918 ULCERATIVE COLITIS, CHRONIC, OTHER COMPLICATION: ICD-10-CM

## 2023-06-06 RX ORDER — MORPHINE SULFATE 20 MG/ML
SOLUTION ORAL
Qty: 240 ML | Refills: 0 | Status: SHIPPED | OUTPATIENT
Start: 2023-06-06 | End: 2023-08-09 | Stop reason: SDUPTHER

## 2023-06-14 ENCOUNTER — OFFICE VISIT (OUTPATIENT)
Dept: SURGERY | Facility: CLINIC | Age: 78
End: 2023-06-14
Payer: MEDICARE

## 2023-06-14 VITALS
HEART RATE: 7 BPM | HEIGHT: 69 IN | DIASTOLIC BLOOD PRESSURE: 76 MMHG | SYSTOLIC BLOOD PRESSURE: 120 MMHG | BODY MASS INDEX: 21.18 KG/M2 | WEIGHT: 143 LBS

## 2023-06-14 DIAGNOSIS — R15.9 FULL INCONTINENCE OF FECES: ICD-10-CM

## 2023-06-14 DIAGNOSIS — K52.9 CHRONIC DIARRHEA: Primary | ICD-10-CM

## 2023-06-14 PROCEDURE — 99214 OFFICE O/P EST MOD 30 MIN: CPT | Mod: S$PBB,25,, | Performed by: COLON & RECTAL SURGERY

## 2023-06-14 PROCEDURE — 44385 ENDOSCOPY OF BOWEL POUCH: CPT | Mod: S$PBB,,, | Performed by: COLON & RECTAL SURGERY

## 2023-06-14 PROCEDURE — 44385 ENDOSCOPY OF BOWEL POUCH: CPT | Mod: 25,PBBFAC | Performed by: COLON & RECTAL SURGERY

## 2023-06-14 PROCEDURE — 99999 PR PBB SHADOW E&M-EST. PATIENT-LVL II: ICD-10-PCS | Mod: PBBFAC,,, | Performed by: COLON & RECTAL SURGERY

## 2023-06-14 PROCEDURE — 44385 PR ENDOSCOPY OF BOWEL POUCH: ICD-10-PCS | Mod: S$PBB,,, | Performed by: COLON & RECTAL SURGERY

## 2023-06-14 PROCEDURE — 99214 PR OFFICE/OUTPT VISIT, EST, LEVL IV, 30-39 MIN: ICD-10-PCS | Mod: S$PBB,25,, | Performed by: COLON & RECTAL SURGERY

## 2023-06-14 PROCEDURE — 99999 PR PBB SHADOW E&M-EST. PATIENT-LVL II: CPT | Mod: PBBFAC,,, | Performed by: COLON & RECTAL SURGERY

## 2023-06-14 PROCEDURE — 99212 OFFICE O/P EST SF 10 MIN: CPT | Mod: PBBFAC | Performed by: COLON & RECTAL SURGERY

## 2023-06-14 RX ORDER — LEVOTHYROXINE SODIUM 75 UG/1
TABLET ORAL
COMMUNITY
Start: 2023-05-19

## 2023-06-14 NOTE — PROGRESS NOTES
"HPI:  Sancho Pete is a 76 y.o. male with history of AMEYA and RPC and IPAA      2 small BMs - one in am and one 4pm  Multiple BMs - between 9 and 1 am     Larger does 20 drops at MN.  10-15 drops other q 6hours    Attempt to change routine - totally through system off  Now just getting things under control last two months.      C/o heartburn, reflux      No past medical history on file.     No past surgical history on file.    Review of patient's allergies indicates:   Allergen Reactions    Codeine     Macrodantin [nitrofurantoin macrocrystalline]     Penicillins        No family history on file.    Social History     Socioeconomic History    Marital status: Single   Tobacco Use    Smoking status: Never    Smokeless tobacco: Former   Substance and Sexual Activity    Alcohol use: No     Alcohol/week: 0.0 standard drinks    Drug use: No       ROS:  GENERAL: No fever, chills, fatigability or weight loss.  Integument: No rashes, redness, icterus  CHEST: Denies BOWSER, cyanosis, wheezing, cough and sputum production.  CARDIOVASCULAR: Denies chest pain, PND, orthopnea or reduced exercise tolerance.  GI: Denies abd pain, dysphagia, nausea, vomiting, no hematemesis   : Denies burning on urination, no hematuria, no bacteriuria  MSK: No deformities, swelling, joint pain swelling  Neurologic: No HAs, seizures, weakness, paresthesias, gait problems    PE:  General appearance elderly in NAD  /76 (BP Location: Left arm, Patient Position: Sitting, BP Method: Small (Automatic))   Pulse (!) 7   Ht 5' 9" (1.753 m)   Wt 64.9 kg (143 lb)   BMI 21.12 kg/m²     Sclera/ Skin anicteric  AT NC EOMI  Neck supple trachea midline   Chest symmetric, nl excursion, no retractions, breathing comfortably  Abdomen  ND soft NT.  no masses, no organomegaly  EXT - no CCE  Neuro:  Mood/ affect nl, alert and oriented x 3, moves all ext's, gait nl    Rectal  Inspection nl  AIDAN low anastomosis, no stenosis      Assessment:  Healthy ileal J " pouch, neoterminal ileum  MSO4 dependent bowel function    Plan:  Continue current MSO4 regimen  OV 1 year  Follow up PCP re reflux and heartburn.

## 2023-06-15 ENCOUNTER — TELEPHONE (OUTPATIENT)
Dept: SURGERY | Facility: CLINIC | Age: 78
End: 2023-06-15
Payer: MEDICARE

## 2023-06-15 NOTE — TELEPHONE ENCOUNTER
----- Message from Karine Erwin LPN sent at 6/14/2023  5:52 PM CDT -----  Regarding: FW: fax verification  Contact: Yissel [phone]    ----- Message -----  From: Lidya Oliveira  Sent: 6/14/2023  10:24 AM CDT  To: Jeancarlos Keating Staff  Subject: fax verification                                 Yissel maguire From [facility] stated they had just received a fax of the office visit for today's visit with the provider but did not receive pg 5 of the fax pls fax over the forms at [phone]

## 2023-06-23 ENCOUNTER — TELEPHONE (OUTPATIENT)
Dept: SURGERY | Facility: CLINIC | Age: 78
End: 2023-06-23
Payer: MEDICARE

## 2023-06-23 NOTE — TELEPHONE ENCOUNTER
----- Message from Joaquin Casanova sent at 6/23/2023  1:16 PM CDT -----  Regarding: Appointment  Contact: [phone]  Pt calling to schedule a one year follow up for next year. Please call if schedule is open.

## 2023-06-29 NOTE — [employer]OVATION PATIENT INSTRUCTIONS
Discharge Summary/Instructions after an Endoscopic Procedure  Patient Name: Sancho Pete  Patient MRN: [id number]  Patient YOB: 1945 Wednesday, June 14, 2023  Rishabh Arshad MD  Dear patient,  As a result of recent federal legislation (The Federal Cures Act), you may   receive lab or pathology results from your procedure in your [facility]   account before your physician is able to contact you. Your physician or   their representative will relay the results to you with their   recommendations at their soonest availability.  Thank you,  RESTRICTIONS:  During your procedure today, you received medications for sedation.  These   medications may affect your judgment, balance and coordination.  Therefore,   for 24 hours, you have the following restrictions:   - DO NOT drive a car, operate machinery, make legal/financial decisions,   sign important papers or drink alcohol.    ACTIVITY:  Today: no heavy lifting, straining or running due to procedural   sedation/anesthesia.  The following day: return to full activity including work.  DIET:  Eat and drink normally unless instructed otherwise.     TREATMENT FOR COMMON SIDE EFFECTS:  - Mild abdominal pain, nausea, belching, bloating or excessive gas:  rest,   eat lightly and use a heating pad.  - Sore Throat: treat with throat lozenges and/or gargle with warm salt   water.  - Because air was used during the procedure, expelling large amounts of air   from your rectum or belching is normal.  - If a bowel prep was taken, you may not have a bowel movement for 1-3 days.    This is normal.  SYMPTOMS TO WATCH FOR AND REPORT TO YOUR PHYSICIAN:  1. Abdominal pain or bloating, other than gas cramps.  2. Chest pain.  3. Back pain.  4. Signs of infection such as: chills or fever occurring within 24 hours   after the procedure.  5. Rectal bleeding, which would show as bright red, maroon, or black stools.   (A tablespoon of blood from the rectum is not serious, especially if    hemorrhoids are present.)  6. Vomiting.  7. Weakness or dizziness.  GO DIRECTLY TO THE NEAREST EMERGENCY ROOM IF YOU HAVE ANY OF THE FOLLOWING:      Difficulty breathing              Chills and/or fever over 101 F   Persistent vomiting and/or vomiting blood   Severe abdominal pain   Severe chest pain   Black, tarry stools   Bleeding- more than one tablespoon   Any other symptom or condition that you feel may need urgent attention  Your doctor recommends these additional instructions:  If any biopsies were taken, your doctors clinic will contact you in 1 to 2   weeks with any results.  - Discharge patient to home (ambulatory).   - Patient has a contact number available for emergencies.  The signs and   symptoms of potential delayed complications were discussed with the   patient.  Return to normal activities tomorrow.  Written discharge   instructions were provided to the patient.   - Resume previous diet.  For questions, problems or results please call your physician - Rishabh Arshad MD at Work:  [phone].  [facility], EMERGENCY ROOM PHONE NUMBER: [phone]  IF A COMPLICATION OR EMERGENCY SITUATION ARISES AND YOU ARE UNABLE TO REACH   YOUR PHYSICIAN - GO DIRECTLY TO THE EMERGENCY ROOM.  Rishabh Arshad MD  6/29/2023 12:42:50 PM  This report has been verified and signed electronically.  Dear patient,  As a result of recent federal legislation (The Federal Cures Act), you may   receive lab or pathology results from your procedure in your [facility]   account before your physician is able to contact you. Your physician or   their representative will relay the results to you with their   recommendations at their soonest availability.  Thank you,  [employer]ATION

## 2023-08-09 DIAGNOSIS — K51.918 ULCERATIVE COLITIS, CHRONIC, OTHER COMPLICATION: ICD-10-CM

## 2023-08-09 RX ORDER — MORPHINE SULFATE 20 MG/ML
SOLUTION ORAL
Qty: 240 ML | Refills: 0 | Status: SHIPPED | OUTPATIENT
Start: 2023-08-09 | End: 2023-10-10 | Stop reason: SDUPTHER

## 2023-10-10 DIAGNOSIS — K51.918 ULCERATIVE COLITIS, CHRONIC, OTHER COMPLICATION: ICD-10-CM

## 2023-10-10 RX ORDER — MORPHINE SULFATE 20 MG/ML
SOLUTION ORAL
Qty: 240 ML | Refills: 0 | Status: SHIPPED | OUTPATIENT
Start: 2023-10-10 | End: 2023-12-20 | Stop reason: SDUPTHER

## 2023-10-10 NOTE — TELEPHONE ENCOUNTER
----- Message from Jovan Sam sent at 10/10/2023  9:23 AM CDT -----  Regarding: refill request  Contact: pt  Refill request.    morphine 100 mg/5 mL (20 mg/mL) concentrated solution      [id number] - [place], [place]  [address] [address]  Phone: [phone] Fax: [phone]

## 2023-12-19 ENCOUNTER — TELEPHONE (OUTPATIENT)
Dept: HEMATOLOGY/ONCOLOGY | Facility: CLINIC | Age: 78
End: 2023-12-19
Payer: MEDICARE

## 2023-12-19 NOTE — TELEPHONE ENCOUNTER
----- Message from Avi Shahid sent at 12/19/2023  9:44 AM CST -----  Contact: [phone]  the patient is calling to get scheduled for a appt.  Pt access tried but no appts are available.  the patient can be reached at.  [phone]

## 2023-12-20 DIAGNOSIS — K51.918 ULCERATIVE COLITIS, CHRONIC, OTHER COMPLICATION: ICD-10-CM

## 2023-12-20 DIAGNOSIS — N63.20 MASS OF LEFT BREAST: Primary | ICD-10-CM

## 2023-12-20 RX ORDER — MORPHINE SULFATE 20 MG/ML
SOLUTION ORAL
Qty: 240 ML | Refills: 0 | Status: SHIPPED | OUTPATIENT
Start: 2023-12-20

## 2023-12-20 RX ORDER — MORPHINE SULFATE 20 MG/ML
SOLUTION ORAL
Qty: 240 ML | Refills: 0 | Status: SHIPPED | OUTPATIENT
Start: 2023-12-20 | End: 2023-12-20 | Stop reason: SDUPTHER

## 2023-12-20 NOTE — TELEPHONE ENCOUNTER
----- Message from Suzanne Cotter sent at 12/20/2023  7:54 AM CST -----  HOWARD GARCIA calling regarding Refills  (message) for morphine 100 mg/5 mL (20 mg/mL) concentrated solution... PT stated that he called in on yesterday and nothing was sent to the Pharmacy. PT stated that he only have 3 doses left and is needing to have it done because he can not go with out the medication due to his condition. please inform when it is sent in  [phone]..the patient stated that the pharmacy will be closing for the holiday's     [id number] - [place], [place] - [address] [address]  Phone: [phone] Fax: [phone]

## 2023-12-20 NOTE — TELEPHONE ENCOUNTER
----- Message from Suzanne Cotter sent at 12/20/2023  7:54 AM CST -----  HOWARD GARCIA calling regarding Refills  (message) for morphine 100 mg/5 mL (20 mg/mL) concentrated solution... PT stated that he called in on yesterday and nothing was sent to the Pharmacy. PT stated that he only have 3 doses left and is needing to have it done because he can not go with out the medication due to his condition. please inform when it is sent in  [phone]..the patient stated that the pharmacy will be closing for the holiday's     [id number] - [place], [place] - [address] [address]  Phone: [phone] Fax: [phone]

## 2024-01-03 ENCOUNTER — HOSPITAL ENCOUNTER (OUTPATIENT)
Dept: RADIOLOGY | Facility: HOSPITAL | Age: 79
Discharge: HOME | End: 2024-01-03
Attending: FAMILY MEDICINE
Payer: MEDICARE

## 2024-01-03 ENCOUNTER — HOSPITAL ENCOUNTER (OUTPATIENT)
Dept: RADIOLOGY | Facility: HOSPITAL | Age: 79
Discharge: HOME | End: 2024-01-03
Payer: MEDICARE

## 2024-01-03 DIAGNOSIS — N63.0 BREAST LUMP: ICD-10-CM

## 2024-01-03 DIAGNOSIS — R92.8 ABNORMAL MAMMOGRAM: ICD-10-CM

## 2024-01-03 DIAGNOSIS — N63.20 MASS OF LEFT BREAST: ICD-10-CM

## 2024-01-03 PROCEDURE — 76642 ULTRASOUND BREAST LIMITED: CPT | Mod: 26,LT,, | Performed by: RADIOLOGY

## 2024-01-03 PROCEDURE — 77066 DX MAMMO INCL CAD BI: CPT | Mod: 26,,, | Performed by: RADIOLOGY

## 2024-01-03 PROCEDURE — 77062 BREAST TOMOSYNTHESIS BI: CPT | Mod: 26,,, | Performed by: RADIOLOGY

## 2024-01-03 PROCEDURE — 76642 ULTRASOUND BREAST LIMITED: CPT | Mod: TC,LT

## 2024-01-03 PROCEDURE — 77062 BREAST TOMOSYNTHESIS BI: CPT | Mod: TC

## 2024-01-24 ENCOUNTER — OFFICE VISIT (OUTPATIENT)
Dept: SURGERY | Facility: CLINIC | Age: 79
End: 2024-01-24
Payer: MEDICARE

## 2024-01-24 VITALS
SYSTOLIC BLOOD PRESSURE: 146 MMHG | DIASTOLIC BLOOD PRESSURE: 78 MMHG | HEART RATE: 86 BPM | WEIGHT: 151.38 LBS | RESPIRATION RATE: 19 BRPM | HEIGHT: 69 IN | OXYGEN SATURATION: 96 % | BODY MASS INDEX: 22.42 KG/M2

## 2024-01-24 DIAGNOSIS — K52.9 CHRONIC DIARRHEA: Primary | ICD-10-CM

## 2024-01-24 PROCEDURE — 99213 OFFICE O/P EST LOW 20 MIN: CPT | Mod: S$PBB,,, | Performed by: COLON & RECTAL SURGERY

## 2024-01-24 PROCEDURE — 99999 PR PBB SHADOW E&M-EST. PATIENT-LVL III: CPT | Mod: PBBFAC,,, | Performed by: COLON & RECTAL SURGERY

## 2024-01-24 PROCEDURE — 99213 OFFICE O/P EST LOW 20 MIN: CPT | Mod: PBBFAC | Performed by: COLON & RECTAL SURGERY

## 2024-01-24 NOTE — PROGRESS NOTES
"HPI:  Sancho Pete is a 78 y.o. male with history of morphine dependent ileal pouch dysfunction and chronic diarrhea.    Had 2 episodes of severe diarrhea which did not improve with the Imodium or morphine.  Did improve with Pepto-Bismol.  Denies any bleeding.    Had pouch endoscopy performed 1 year ago which was unremarkable.        No past medical history on file.     No past surgical history on file.    Review of patient's allergies indicates:   Allergen Reactions    Codeine     Macrodantin [nitrofurantoin macrocrystalline]     Penicillins        No family history on file.    Social History     Socioeconomic History    Marital status: Single   Tobacco Use    Smoking status: Never    Smokeless tobacco: Former   Substance and Sexual Activity    Alcohol use: No     Alcohol/week: 0.0 standard drinks of alcohol    Drug use: No       ROS:  GENERAL: No fever, chills, fatigability or weight loss.  Integument: No rashes, redness, icterus  CHEST: Denies BOWSER, cyanosis, wheezing, cough and sputum production.  CARDIOVASCULAR: Denies chest pain, PND, orthopnea or reduced exercise tolerance.  GI: Denies abd pain, dysphagia, nausea, vomiting, no hematemesis   : Denies burning on urination, no hematuria, no bacteriuria  MSK: No deformities, swelling, joint pain swelling  Neurologic: No HAs, seizures, weakness, paresthesias, gait problems    PE:  General appearance well  BP (!) 146/78 (BP Location: Left arm, Patient Position: Sitting)   Pulse 86   Resp 19   Ht 5' 9.02" (1.753 m)   Wt 68.6 kg (151 lb 5.5 oz)   SpO2 96%   BMI 22.34 kg/m²   Sclera/ Skin anicteric  LN none palpable  AT NC EOMI  Neck supple trachea midline   Chest symmetric, nl excursion, no retractions, breathing comfortably  Abdomen  ND soft NT.  no masses, o organomegaly  EXT - no CCE  Neuro:  Mood/ affect nl, alert and oriented x 3, moves all ext's, gait nl      Assessment:  MSO4 dependent loose stools following IPAA  Benefitted from using " Pepto-Bismol    Plan:  Use peptobismol prn  Pouch endoscopy every 3 years.

## 2024-02-20 ENCOUNTER — TELEPHONE (OUTPATIENT)
Dept: SURGERY | Facility: HOSPITAL | Age: 79
End: 2024-02-20
Payer: MEDICARE

## 2024-02-20 NOTE — TELEPHONE ENCOUNTER
----- Message from Mae Persaud RN sent at 2/20/2024  8:44 AM CST -----  Regarding: FW: Refill  Contact: pt.  [phone]  Johnson Correa,   The pt is requesting a refill on Morphine drops. Dr. Arshad indicated that he has an morphine-dependant ileal pouch dysfunction and chronic diarrhea. He was seen on 1/24 and it looks like Dr. Arshad recommended Pepto bismol. I'm not sure if he wanted to continue the morphine.     Thanks,   Mae   ----- Message -----  From: Mala Addison  Sent: 2/20/2024   8:06 AM CST  To: Jeanc[facility] Keating Staff  Subject: Refill                                           Rx Refill/Request Is this a Refill or New Rx:  Refill    Rx Name and Strength:  morphine 100 mg/5 mL (20 mg/mL) concentrated solution    Preferred Pharmacy with phone number:     [id number] [address] [address]  Phone: [phone] Fax: [phone]      Communication Preference: [phone]   Additional Information:

## 2024-02-20 NOTE — TELEPHONE ENCOUNTER
Sw pt and he did not agree to stop the morphine, told him this is what is in Dr. Arshad's notes and to try the pepto bismol, said he would try but not very happy